# Patient Record
Sex: FEMALE | Race: WHITE | NOT HISPANIC OR LATINO | Employment: FULL TIME | URBAN - METROPOLITAN AREA
[De-identification: names, ages, dates, MRNs, and addresses within clinical notes are randomized per-mention and may not be internally consistent; named-entity substitution may affect disease eponyms.]

---

## 2023-04-05 ENCOUNTER — APPOINTMENT (OUTPATIENT)
Dept: LAB | Facility: LAB | Age: 38
End: 2023-04-05
Payer: COMMERCIAL

## 2023-04-05 LAB
ERYTHROCYTE DISTRIBUTION WIDTH (RATIO) BY AUTOMATED COUNT: 17.1 % (ref 11.5–14.5)
ERYTHROCYTE MEAN CORPUSCULAR HEMOGLOBIN CONCENTRATION (G/DL) BY AUTOMATED: 29.8 G/DL (ref 32–36)
ERYTHROCYTE MEAN CORPUSCULAR VOLUME (FL) BY AUTOMATED COUNT: 70 FL (ref 80–100)
ERYTHROCYTES (10*6/UL) IN BLOOD BY AUTOMATED COUNT: 4.08 X10E12/L (ref 4–5.2)
FERRITIN, PREGNANCY: 63 UG/L
HEMATOCRIT (%) IN BLOOD BY AUTOMATED COUNT: 28.5 % (ref 36–46)
HEMOGLOBIN (G/DL) IN BLOOD: 8.5 G/DL (ref 12–16)
IRON (UG/DL) IN SER/PLAS IN PREGNANCY: 98 UG/DL
IRON BINDING CAPACITY (UG/DL) IN PREGNANCY: 424 UG/DL
IRON SATURATION (%) IN PREGNANCY: 23 %
LEUKOCYTES (10*3/UL) IN BLOOD BY AUTOMATED COUNT: 11.6 X10E9/L (ref 4.4–11.3)
NRBC (PER 100 WBCS) BY AUTOMATED COUNT: 0.7 /100 WBC (ref 0–0)
PLATELETS (10*3/UL) IN BLOOD AUTOMATED COUNT: 298 X10E9/L (ref 150–450)
REFLEX ADDED, ANEMIA PANEL: ABNORMAL
SYPHILIS TOTAL AB: NONREACTIVE
VARICELLA ZOSTER IGG: NEGATIVE

## 2023-04-08 LAB — VARICELLA-ZOSTER AB, IGM: 0.17 ISR

## 2023-04-14 ENCOUNTER — HOSPITAL ENCOUNTER (OUTPATIENT)
Dept: DATA CONVERSION | Facility: HOSPITAL | Age: 38
End: 2023-04-14
Attending: OBSTETRICS & GYNECOLOGY | Admitting: OBSTETRICS & GYNECOLOGY
Payer: COMMERCIAL

## 2023-04-14 DIAGNOSIS — E28.2 POLYCYSTIC OVARIAN SYNDROME: ICD-10-CM

## 2023-04-14 DIAGNOSIS — F41.9 ANXIETY DISORDER, UNSPECIFIED: ICD-10-CM

## 2023-04-14 DIAGNOSIS — O99.012 ANEMIA COMPLICATING PREGNANCY, SECOND TRIMESTER (HHS-HCC): ICD-10-CM

## 2023-04-14 DIAGNOSIS — O99.342 OTHER MENTAL DISORDERS COMPLICATING PREGNANCY, SECOND TRIMESTER (HHS-HCC): ICD-10-CM

## 2023-04-14 DIAGNOSIS — Z79.899 OTHER LONG TERM (CURRENT) DRUG THERAPY: ICD-10-CM

## 2023-04-14 DIAGNOSIS — F32.A DEPRESSION, UNSPECIFIED: ICD-10-CM

## 2023-04-14 DIAGNOSIS — O12.02: ICD-10-CM

## 2023-04-14 DIAGNOSIS — Z3A.24 24 WEEKS GESTATION OF PREGNANCY (HHS-HCC): ICD-10-CM

## 2023-04-14 DIAGNOSIS — O99.282 ENDOCRINE, NUTRITIONAL AND METABOLIC DISEASES COMPLICATING PREGNANCY, SECOND TRIMESTER (HHS-HCC): ICD-10-CM

## 2023-04-14 DIAGNOSIS — Z86.16 PERSONAL HISTORY OF COVID-19: ICD-10-CM

## 2023-04-14 DIAGNOSIS — D64.9 ANEMIA, UNSPECIFIED: ICD-10-CM

## 2023-04-24 ENCOUNTER — APPOINTMENT (OUTPATIENT)
Dept: LAB | Facility: LAB | Age: 38
End: 2023-04-24
Payer: COMMERCIAL

## 2023-04-24 LAB — GLUCOSE, 1 HR SCREEN, PREG: 118 MG/DL

## 2023-07-03 ENCOUNTER — APPOINTMENT (OUTPATIENT)
Dept: LAB | Facility: LAB | Age: 38
End: 2023-07-03
Payer: COMMERCIAL

## 2023-07-05 LAB
ERYTHROCYTE DISTRIBUTION WIDTH (RATIO) BY AUTOMATED COUNT: 18.9 % (ref 11.5–14.5)
ERYTHROCYTE DISTRIBUTION WIDTH (RATIO) BY AUTOMATED COUNT: NORMAL %
ERYTHROCYTE MEAN CORPUSCULAR HEMOGLOBIN CONCENTRATION (G/DL) BY AUTOMATED: 25.9 G/DL (ref 32–36)
ERYTHROCYTE MEAN CORPUSCULAR HEMOGLOBIN CONCENTRATION (G/DL) BY AUTOMATED: NORMAL G/DL
ERYTHROCYTE MEAN CORPUSCULAR VOLUME (FL) BY AUTOMATED COUNT: 83 FL (ref 80–100)
ERYTHROCYTE MEAN CORPUSCULAR VOLUME (FL) BY AUTOMATED COUNT: NORMAL FL
ERYTHROCYTES (10*6/UL) IN BLOOD BY AUTOMATED COUNT: 4.59 X10E12/L (ref 4–5.2)
ERYTHROCYTES (10*6/UL) IN BLOOD BY AUTOMATED COUNT: NORMAL X10E12/L
FERRITIN (UG/LL) IN SER/PLAS: 25 UG/L (ref 8–150)
FERRITIN, PREGNANCY: 22 UG/L
FOLATE, SERUM, PREGNANCY: 19.8 NG/ML
HEMATOCRIT (%) IN BLOOD BY AUTOMATED COUNT: 37.9 % (ref 36–46)
HEMATOCRIT (%) IN BLOOD BY AUTOMATED COUNT: NORMAL %
HEMOGLOBIN (G/DL) IN BLOOD: 9.8 G/DL (ref 12–16)
HEMOGLOBIN (G/DL) IN BLOOD: NORMAL G/DL
HEMOGLOBIN (PG) IN RETICULOCYTES: NORMAL PG
IMMATURE RETIC FRACTION: NORMAL %
IRON (UG/DL) IN SER/PLAS IN PREGNANCY: 107 UG/DL
IRON BINDING CAPACITY (UG/DL) IN PREGNANCY: >557 UG/DL
IRON SATURATION (%) IN PREGNANCY: ABNORMAL %
LEUKOCYTES (10*3/UL) IN BLOOD BY AUTOMATED COUNT: 12.3 X10E9/L (ref 4.4–11.3)
LEUKOCYTES (10*3/UL) IN BLOOD BY AUTOMATED COUNT: NORMAL X10E9/L
NRBC (PER 100 WBCS) BY AUTOMATED COUNT: 1.4 /100 WBC (ref 0–0)
NRBC (PER 100 WBCS) BY AUTOMATED COUNT: NORMAL /100 WBC
PLATELETS (10*3/UL) IN BLOOD AUTOMATED COUNT: 219 X10E9/L (ref 150–450)
PLATELETS (10*3/UL) IN BLOOD AUTOMATED COUNT: NORMAL X10E9/L
REFLEX ADDED, ANEMIA PANEL: ABNORMAL
REFLEX ADDED, ANEMIA PANEL: NORMAL
RETICULOCYTES (10*3/UL) IN BLOOD: NORMAL X10E12/L
RETICULOCYTES/100 ERYTHROCYTES IN BLOOD BY AUTOMATED COUNT: NORMAL %
VITAMIN B12, PREGNANCY: 275 PG/ML

## 2023-07-08 LAB — GROUP B STREP SCREEN: NORMAL

## 2023-07-10 ENCOUNTER — HOSPITAL ENCOUNTER (OUTPATIENT)
Dept: DATA CONVERSION | Facility: HOSPITAL | Age: 38
End: 2023-07-11
Attending: STUDENT IN AN ORGANIZED HEALTH CARE EDUCATION/TRAINING PROGRAM
Payer: COMMERCIAL

## 2023-07-10 DIAGNOSIS — O09.513 SUPERVISION OF ELDERLY PRIMIGRAVIDA, THIRD TRIMESTER (HHS-HCC): ICD-10-CM

## 2023-07-10 DIAGNOSIS — Z3A.37 37 WEEKS GESTATION OF PREGNANCY (HHS-HCC): ICD-10-CM

## 2023-07-10 DIAGNOSIS — E28.2 POLYCYSTIC OVARIAN SYNDROME: ICD-10-CM

## 2023-07-10 DIAGNOSIS — O99.283 ENDOCRINE, NUTRITIONAL AND METABOLIC DISEASES COMPLICATING PREGNANCY, THIRD TRIMESTER (HHS-HCC): ICD-10-CM

## 2023-07-10 DIAGNOSIS — O36.8130 DECREASED FETAL MOVEMENTS, THIRD TRIMESTER, NOT APPLICABLE OR UNSPECIFIED (HHS-HCC): ICD-10-CM

## 2023-07-10 DIAGNOSIS — O99.013 ANEMIA COMPLICATING PREGNANCY, THIRD TRIMESTER (HHS-HCC): ICD-10-CM

## 2023-07-10 DIAGNOSIS — D56.3 THALASSEMIA MINOR: ICD-10-CM

## 2023-09-07 VITALS
WEIGHT: 166.45 LBS | HEART RATE: 99 BPM | DIASTOLIC BLOOD PRESSURE: 66 MMHG | TEMPERATURE: 97.9 F | OXYGEN SATURATION: 99 % | RESPIRATION RATE: 16 BRPM | BODY MASS INDEX: 29.49 KG/M2 | HEIGHT: 63 IN | SYSTOLIC BLOOD PRESSURE: 104 MMHG

## 2023-09-14 NOTE — PROGRESS NOTES
Current Stage:   Stage: Triage     Subjective Data:   Antepartum:  Vaginal Bleeding: No   Contractions/Abdominal Pain: Yes   Discharge/Loss of Fluid: No   Fetal Movement: Good   Fevers/Chills: No   Preeclampsia Symptoms: No   Antepartum:    DOA 2023    37 yo  at 24.6 by week 7.3wk US presents for LLL swelling.    Patient was reportedly driving earlier today for 2 hours when she noticed her LLE was more swollen than usual.  is a neurology resident here. Advised she come get checked out for DVT rule out. on Arrival, left leg mildly more swollen than the right  extending down to the foot/ankle and up to the thigh. No erythema, no tenderness associated. No hx of clots or hypercoagulability. Covid+ in march, anemic to 8.5 during this pregnancy. No other complaints today.    Pregnancy notable for:  - PCOS, anxiety, depression  - Covid+ in March  - Anemia to 8.5    OBHx: G1  GYN hx:  normal Pap smear  PMH: anxiety, depression, PCOS  PSH: none  Fam hx: reviewed and not pertinent  Meds: PNV, lexapro  All: NKDA  Social hx: denies t/e/d        Objective Information:    Objective Information:      T   P  R  BP   MAP  SpO2   Value  37.1  82  16  108/72      100%  Date/Time  18:07  18:07  18:07  18:07     18:07  Range  (36.6C - 37.1C )  (82 - 99 )  (16 - 16 )  (104 - 108 )/ (66 - 72 )    (99% - 100% )  Highest temp of 37.1 C was recorded at  18:07      Pain reported at  18:07: 0 = None      Physical Exam:   Constitutional: alert, oriented   Obstetric: Fundus palpated above the umbilicus. No  abdominal pain on palpation. Good fetal movement, no vaginal discharge/blood. 0/0%/-3 on cervical exam.   Eyes: pupils equal, sclera clear   ENMT: Normocephalic, atraumatic   Respiratory/Thorax: normal respiratory effort, lungs  clear, no wheezes or rhonchi   Cardiovascular: S1 S2 RRR, no murmurs   Extremities: Normal ROM, Minor edema to the LLE >  right, no tenderness associated. No erythema.    Neurological: CNs II-XII grossly intact, AAOx4, speech  is fluent and comprehensible   Psychological: Appropriate mood and affect, not responding  to internal stimuli   Skin: no rashes or lesions      Testing:   NST Interpretation - Baby A:  ·  Baseline    ·  Variability moderate (amplitude range 6 to 25 bpm)   ·  Interpretation Reactive (2 15x15 accels)   ·  Accelerations +   ·  Decelerations -     Assessment and Plan:   Assessment:    37 yo  at 24.6 by week 7.3wk US presents for LLL swelling.    LLE swelling  - Minor increased edema compared to the R  - No erythema or tenderness, pretest probability for DVT low  - Duplex LLE for definitive rule out, If negative can be discharged home.    Maternal Well being  - Vital signs stable and WNL  - Emotional support and reassurance provided  - All questions and concerns addressed     IUP at 24.6  - NST reactive, + accelerations, no decelerations  - Good fetal movement  - Continue routine prenatal care      Patient staffed with Dr. Garcia and signed out to the night team with LLE duplex pending.    Matt Tyson MD  Emergency Medicine PGY1      Attestation:   Note Completion:  I am a:  Resident/Fellow   Attending Attestation I saw and evaluated the patient.  I personally obtained the key and critical portions of the history and physical exam or was physically present for key and  critical portions performed by the resident/fellow. I reviewed the resident/fellow?s documentation and discussed the patient with the resident/fellow.  I agree with the resident/fellow?s medical decision making as documented in the note.     I personally evaluated the patient on 2023         Electronic Signatures:  Matt Tyson (MD (Resident))  (Signed 2023 19:35)   Authored: Current Stage, Subjective Data, Objective Data,   Testing, Assessment and Plan, Note Completion  Oscar Garcia)  (Signed 2023 21:09)   Authored: Note  Completion   Co-Signer: Current Stage, Subjective Data, Objective Data,  Testing, Assessment and Plan, Note Completion      Last Updated: 2023 21:09 by Oscar Garcia)

## 2023-09-29 VITALS — WEIGHT: 180.34 LBS | BODY MASS INDEX: 31.95 KG/M2 | HEIGHT: 63 IN

## 2023-09-30 NOTE — PROGRESS NOTES
Current Stage:   Stage: Triage     Subjective Data:   Antepartum:  Vaginal Bleeding: No   Contractions/Abdominal Pain: No   Discharge/Loss of Fluid: No   Fetal Movement: Decreased   Antepartum:    39yo G1 at 37.3 wga by LMP consistent w/ 7 wk US who presents for decreased fetal movement. The patient reports baby typically is very active in the evenings but  she noticed decreased movement this evening. She tracked 6 kicks in an hour while intentionally provoking fetal movement, which was concerning for her as baby normally has 10 kicks within 15 min. In triage, the patient denies vaginal bleeding or fluid  loss. She denies contractions and only notes rare Coleman Gong for the past few weeks.     Pregnancy notable for:  - COVID-19 in 2nd tri  - anemia, beta thalassemia trait  - AMA s/p rr cfDNA  - decreased interval growth, EFW 16%, AC 11%    Obhx: G1  GynHx: PMDD, PCOS  SurgHx: per pt, ex lap in  for dysmenorrhea neg for endometriosis   Meds: Prenatal   Allergies: none  Social: here with , works from home, moving to [facility] in August; no t/e/d      Objective Information:    Objective Information:      T   P  R  BP   MAP  SpO2   Value  36.3  83  16  109/71      99%  Date/Time 7/10 23:40 7/10 23:40 7/10 23:40 7/10 23:40    7/10 23:40  Range  (36.3C - 36.3C )  (83 - 83 )  (16 - 16 )  (109 - 109 )/ (71 - 71 )    (99% - 99% )      Pain reported at 7/10 23:40: 0 = None      Physical Exam:   Constitutional: Patient resting in bed comfortably,  appears well.   Obstetric: FHT: baseline 130, moderate variability,  + accels, no decels  [place]: quiet  PEYTON 12   Eyes: EOMI   Respiratory/Thorax: Breathing comfortably on RA   Cardiovascular: Warm and well perfused   Gastrointestinal: Abdomen gravid, nontender   Extremities: Spontaneously moving all extremeties   Neurological: A&O x3   Psychological: Appropriate affect      Testing:   NST Interpretation - Baby A:  ·  Baseline    ·  Variability moderate  (amplitude range 6 to 25 bpm)   ·  Interpretation Reactive (2 15x15 accels)   ·  Accelerations +   ·  Decelerations -     Assessment and Plan:   Comorbidity:  ·  Comorbidty anemia   ·  Anemia iron deficiency anemia     Assessment:    Pt is a 39yo  at 37.3 wga by LMP consistent w/ 7 wk US here for decreased fetal movement.    Decreased Fetal Movement   - Movement improved in triage  - reactive NST, PEYTON 12  - Patient counseled on kick counts and return precautions provided  - Follow up dex'd with Dr. Hardy on     Seen and discussed with Dr. Vu Rooney MS3    Patient seen and evaluated with medical student. Agree with assessment above, edits made within text.  Shilpa Dc, PGY-3    Attestation:   Note Completion:  I am a:  Resident/Fellow   Attending Attestation I saw and evaluated the patient.  I personally obtained the key and critical portions of the history and physical exam or was physically present for key and  critical portions performed by the resident/fellow. I reviewed the resident/fellow?s documentation and discussed the patient with the resident/fellow.  I agree with the resident/fellow?s medical decision making as documented in the note.     I personally evaluated the patient on 10-Jul-2023   Comments/ Additional Findings    Seen and agree with assessment and plan as documented.  Carline Womack MD PGY-7          Electronic Signatures:  Tahira Womack (MD (Fellow))  (Signed 2023 11:58)   Authored: Note Completion   Co-Signer: Note Completion  Shilpa Dc (MD (Resident))  (Signed 2023 03:25)   Authored: Subjective Data, Objective Data,   Testing, Assessment and Plan, Note Completion  Ness Rooney (MED STUD)  (Signed 2023 01:15)   Authored: Current Stage, Subjective Data, Objective Data,  Assessment and Plan      Last Updated: 2023 11:58 by Tahira Womack (MD (Fellow))

## 2023-11-14 ENCOUNTER — APPOINTMENT (OUTPATIENT)
Dept: HEMATOLOGY/ONCOLOGY | Facility: HOSPITAL | Age: 38
End: 2023-11-14
Payer: COMMERCIAL